# Patient Record
Sex: MALE | Race: OTHER | NOT HISPANIC OR LATINO | URBAN - METROPOLITAN AREA
[De-identification: names, ages, dates, MRNs, and addresses within clinical notes are randomized per-mention and may not be internally consistent; named-entity substitution may affect disease eponyms.]

---

## 2018-05-20 ENCOUNTER — INPATIENT (INPATIENT)
Facility: HOSPITAL | Age: 23
LOS: 1 days | Discharge: HOME | DRG: 312 | End: 2018-05-22
Attending: INTERNAL MEDICINE | Admitting: INTERNAL MEDICINE
Payer: MEDICAID

## 2018-05-20 VITALS
WEIGHT: 179.9 LBS | DIASTOLIC BLOOD PRESSURE: 88 MMHG | OXYGEN SATURATION: 100 % | HEART RATE: 75 BPM | SYSTOLIC BLOOD PRESSURE: 134 MMHG | HEIGHT: 68 IN | RESPIRATION RATE: 16 BRPM

## 2018-05-20 DIAGNOSIS — R41.89 OTHER SYMPTOMS AND SIGNS INVOLVING COGNITIVE FUNCTIONS AND AWARENESS: ICD-10-CM

## 2018-05-20 DIAGNOSIS — R55 SYNCOPE AND COLLAPSE: ICD-10-CM

## 2018-05-20 DIAGNOSIS — Z29.9 ENCOUNTER FOR PROPHYLACTIC MEASURES, UNSPECIFIED: ICD-10-CM

## 2018-05-20 LAB
ACETONE SERPL-MCNC: NEGATIVE — SIGNIFICANT CHANGE UP
ALBUMIN SERPL ELPH-MCNC: 4.1 G/DL — SIGNIFICANT CHANGE UP (ref 3.5–5)
ALP SERPL-CCNC: 56 U/L — SIGNIFICANT CHANGE UP (ref 40–120)
ALT FLD-CCNC: 16 U/L DA — SIGNIFICANT CHANGE UP (ref 10–60)
AMPHET UR-MCNC: NEGATIVE — SIGNIFICANT CHANGE UP
ANION GAP SERPL CALC-SCNC: 8 MMOL/L — SIGNIFICANT CHANGE UP (ref 5–17)
APAP SERPL-MCNC: <2 UG/ML — LOW (ref 10–30)
APPEARANCE UR: CLEAR — SIGNIFICANT CHANGE UP
APTT BLD: 26.7 SEC — LOW (ref 27.5–37.4)
AST SERPL-CCNC: 16 U/L — SIGNIFICANT CHANGE UP (ref 10–40)
BACTERIA # UR AUTO: ABNORMAL /HPF
BARBITURATES UR SCN-MCNC: NEGATIVE — SIGNIFICANT CHANGE UP
BASE EXCESS BLDA CALC-SCNC: 1.4 MMOL/L — SIGNIFICANT CHANGE UP (ref -2–2)
BASOPHILS # BLD AUTO: 0.1 K/UL — SIGNIFICANT CHANGE UP (ref 0–0.2)
BASOPHILS NFR BLD AUTO: 1 % — SIGNIFICANT CHANGE UP (ref 0–2)
BENZODIAZ UR-MCNC: NEGATIVE — SIGNIFICANT CHANGE UP
BILIRUB SERPL-MCNC: 0.4 MG/DL — SIGNIFICANT CHANGE UP (ref 0.2–1.2)
BILIRUB UR-MCNC: NEGATIVE — SIGNIFICANT CHANGE UP
BLOOD GAS COMMENTS ARTERIAL: SIGNIFICANT CHANGE UP
BUN SERPL-MCNC: 10 MG/DL — SIGNIFICANT CHANGE UP (ref 7–18)
CALCIUM SERPL-MCNC: 8.8 MG/DL — SIGNIFICANT CHANGE UP (ref 8.4–10.5)
CHLORIDE SERPL-SCNC: 107 MMOL/L — SIGNIFICANT CHANGE UP (ref 96–108)
CK SERPL-CCNC: 161 U/L — SIGNIFICANT CHANGE UP (ref 35–232)
CO2 SERPL-SCNC: 26 MMOL/L — SIGNIFICANT CHANGE UP (ref 22–31)
COCAINE METAB.OTHER UR-MCNC: NEGATIVE — SIGNIFICANT CHANGE UP
COLOR SPEC: YELLOW — SIGNIFICANT CHANGE UP
COMMENT - URINE: SIGNIFICANT CHANGE UP
CREAT SERPL-MCNC: 1.12 MG/DL — SIGNIFICANT CHANGE UP (ref 0.5–1.3)
DIFF PNL FLD: ABNORMAL
EOSINOPHIL # BLD AUTO: 0.2 K/UL — SIGNIFICANT CHANGE UP (ref 0–0.5)
EOSINOPHIL NFR BLD AUTO: 1.9 % — SIGNIFICANT CHANGE UP (ref 0–6)
EPI CELLS # UR: ABNORMAL /HPF
ETHANOL SERPL-MCNC: <3 MG/DL — SIGNIFICANT CHANGE UP (ref 0–10)
GLUCOSE SERPL-MCNC: 108 MG/DL — HIGH (ref 70–99)
GLUCOSE UR QL: NEGATIVE — SIGNIFICANT CHANGE UP
HCO3 BLDA-SCNC: 25 MMOL/L — SIGNIFICANT CHANGE UP (ref 23–27)
HCT VFR BLD CALC: 48.2 % — SIGNIFICANT CHANGE UP (ref 39–50)
HGB BLD-MCNC: 15.8 G/DL — SIGNIFICANT CHANGE UP (ref 13–17)
HOROWITZ INDEX BLDA+IHG-RTO: 21 — SIGNIFICANT CHANGE UP
INR BLD: 1.06 RATIO — SIGNIFICANT CHANGE UP (ref 0.88–1.16)
KETONES UR-MCNC: NEGATIVE — SIGNIFICANT CHANGE UP
LACTATE SERPL-SCNC: 1.7 MMOL/L — SIGNIFICANT CHANGE UP (ref 0.7–2)
LEUKOCYTE ESTERASE UR-ACNC: NEGATIVE — SIGNIFICANT CHANGE UP
LYMPHOCYTES # BLD AUTO: 3.8 K/UL — HIGH (ref 1–3.3)
LYMPHOCYTES # BLD AUTO: 39.2 % — SIGNIFICANT CHANGE UP (ref 13–44)
MAGNESIUM SERPL-MCNC: 2.2 MG/DL — SIGNIFICANT CHANGE UP (ref 1.6–2.6)
MCHC RBC-ENTMCNC: 29.3 PG — SIGNIFICANT CHANGE UP (ref 27–34)
MCHC RBC-ENTMCNC: 32.7 GM/DL — SIGNIFICANT CHANGE UP (ref 32–36)
MCV RBC AUTO: 89.6 FL — SIGNIFICANT CHANGE UP (ref 80–100)
METHADONE UR-MCNC: NEGATIVE — SIGNIFICANT CHANGE UP
MONOCYTES # BLD AUTO: 1.3 K/UL — HIGH (ref 0–0.9)
MONOCYTES NFR BLD AUTO: 13.6 % — SIGNIFICANT CHANGE UP (ref 2–14)
NEUTROPHILS # BLD AUTO: 4.2 K/UL — SIGNIFICANT CHANGE UP (ref 1.8–7.4)
NEUTROPHILS NFR BLD AUTO: 44.3 % — SIGNIFICANT CHANGE UP (ref 43–77)
NITRITE UR-MCNC: NEGATIVE — SIGNIFICANT CHANGE UP
OPIATES UR-MCNC: NEGATIVE — SIGNIFICANT CHANGE UP
PCO2 BLDA: 39 MMHG — SIGNIFICANT CHANGE UP (ref 32–46)
PCP SPEC-MCNC: SIGNIFICANT CHANGE UP
PCP UR-MCNC: NEGATIVE — SIGNIFICANT CHANGE UP
PH BLDA: 7.43 — SIGNIFICANT CHANGE UP (ref 7.35–7.45)
PH UR: 6 — SIGNIFICANT CHANGE UP (ref 5–8)
PLATELET # BLD AUTO: 213 K/UL — SIGNIFICANT CHANGE UP (ref 150–400)
PO2 BLDA: 101 MMHG — SIGNIFICANT CHANGE UP (ref 74–108)
POTASSIUM SERPL-MCNC: 3.4 MMOL/L — LOW (ref 3.5–5.3)
POTASSIUM SERPL-SCNC: 3.4 MMOL/L — LOW (ref 3.5–5.3)
PROT SERPL-MCNC: 7.5 G/DL — SIGNIFICANT CHANGE UP (ref 6–8.3)
PROT UR-MCNC: NEGATIVE — SIGNIFICANT CHANGE UP
PROTHROM AB SERPL-ACNC: 11.6 SEC — SIGNIFICANT CHANGE UP (ref 9.8–12.7)
RBC # BLD: 5.38 M/UL — SIGNIFICANT CHANGE UP (ref 4.2–5.8)
RBC # FLD: 11.2 % — SIGNIFICANT CHANGE UP (ref 10.3–14.5)
RBC CASTS # UR COMP ASSIST: SIGNIFICANT CHANGE UP /HPF (ref 0–2)
SALICYLATES SERPL-MCNC: <1.7 MG/DL — LOW (ref 2.8–20)
SAO2 % BLDA: 98 % — HIGH (ref 92–96)
SODIUM SERPL-SCNC: 141 MMOL/L — SIGNIFICANT CHANGE UP (ref 135–145)
SP GR SPEC: 1.01 — SIGNIFICANT CHANGE UP (ref 1.01–1.02)
THC UR QL: NEGATIVE — SIGNIFICANT CHANGE UP
UROBILINOGEN FLD QL: NEGATIVE — SIGNIFICANT CHANGE UP
WBC # BLD: 9.6 K/UL — SIGNIFICANT CHANGE UP (ref 3.8–10.5)
WBC # FLD AUTO: 9.6 K/UL — SIGNIFICANT CHANGE UP (ref 3.8–10.5)
WBC UR QL: SIGNIFICANT CHANGE UP /HPF (ref 0–5)

## 2018-05-20 PROCEDURE — 71045 X-RAY EXAM CHEST 1 VIEW: CPT | Mod: 26

## 2018-05-20 PROCEDURE — 70450 CT HEAD/BRAIN W/O DYE: CPT | Mod: 26,59

## 2018-05-20 PROCEDURE — 99223 1ST HOSP IP/OBS HIGH 75: CPT

## 2018-05-20 PROCEDURE — 70498 CT ANGIOGRAPHY NECK: CPT | Mod: 26

## 2018-05-20 PROCEDURE — 72125 CT NECK SPINE W/O DYE: CPT | Mod: 26

## 2018-05-20 PROCEDURE — 99291 CRITICAL CARE FIRST HOUR: CPT

## 2018-05-20 PROCEDURE — 99292 CRITICAL CARE ADDL 30 MIN: CPT

## 2018-05-20 PROCEDURE — 70496 CT ANGIOGRAPHY HEAD: CPT | Mod: 26

## 2018-05-20 RX ORDER — SODIUM CHLORIDE 9 MG/ML
1000 INJECTION INTRAMUSCULAR; INTRAVENOUS; SUBCUTANEOUS
Qty: 0 | Refills: 0 | Status: DISCONTINUED | OUTPATIENT
Start: 2018-05-20 | End: 2018-05-21

## 2018-05-20 RX ADMIN — SODIUM CHLORIDE 200 MILLILITER(S): 9 INJECTION INTRAMUSCULAR; INTRAVENOUS; SUBCUTANEOUS at 17:25

## 2018-05-20 NOTE — ED PROVIDER NOTE - PROGRESS NOTE DETAILS
pt cont. with eyes open, unresponsive, will consult Neuro, admission spoke with Neuro, suggests to get CT head/neck angio, then LP shortly after speaking with neurologist, pt sat up, OX3, claims he can hear our conversation, but just can't move Seen by Dr. Meek neuro, advised to admit pt for poss seizure

## 2018-05-20 NOTE — ED PROVIDER NOTE - CARE PLAN
Principal Discharge DX:	Unresponsiveness  Secondary Diagnosis:	Seizure  Secondary Diagnosis:	Psychogenic coma

## 2018-05-20 NOTE — H&P ADULT - ASSESSMENT
22 years old male with no PMH or PSH presented to ED with syncope and unresponsiveness. 22 years old male with no PMH or PSH presented to ED with syncope and unresponsiveness. Admitted to medicine for further management.

## 2018-05-20 NOTE — ED ADULT NURSE NOTE - ED STAT RN HANDOFF DETAILS 2
patient awake and alert, breathing not distressed. IV Acces remans in-situ. Patient is being nursed on cardiac monitor. Sinus rhythm noted. No seizure activity noted. Patient I being admitted, awaiting bed availability.

## 2018-05-20 NOTE — ED PROVIDER NOTE - PHYSICAL EXAMINATION
NEUROLOGICAL: Awake w/ eyes open but unresponsive, Babinski down, no clonus. NEUROLOGICAL: Awake w/ eyes open but unresponsive, Babinski down, no clonus.  Head- Rt frontal sl hematoma noted

## 2018-05-20 NOTE — ED PROVIDER NOTE - OBJECTIVE STATEMENT
21 y/o M coming from home was BIB EMS c/o AMS x today. EMS reports that pt not talking, no response to rub, pupils continuously dilated. Pt not on meds, no PMHx at all-- reports that pt took Theraflu for mild cough x 2 days and fell asleep, woke up, went to the bathroom, fell and hit his head and became unresponsive. Sudden onset. EMS denies shaking, sister denies shaking-- reports that pt became "frozen." Non-smoker, non-drinker, no drugs as per sister. NKDA.

## 2018-05-20 NOTE — H&P ADULT - HISTORY OF PRESENT ILLNESS
22 years old male with no PMH or PSH presented to ED with syncope and unresponsiveness. Family reported patient synopsized after coming back from bathroom and became unresponsive so EMS was called. Family denies any shaking, eye rolling or urinary or fecal incontinence.  Per EMS, patient was unresponsive, not talking, no response to rub, pupils continuously dilated.     In ED patient was still unresponsive to painful stimuli so Neurology was consulted. Initial vitals wnl. EKG sinus arrythmia. CT head , CT cervical spine and CTA Head unremarkable. Patient woke up spontaneously in a while, was alert, awake and oriented x 3, initially said can hear only but cant move ; later was able to move his extremities as well. Was then assessed by Neurologist in ED and admitted for further monitoring.     Patient seen and examined at bedside, currently is AAO x 3, denies any complains. 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 Reported slight dizziness before the event but denied any other complains.

## 2018-05-20 NOTE — H&P ADULT - PROBLEM SELECTOR PLAN 2
IMPROVE VTE Individual Risk Assessment          RISK                                                          Points  [  ] Previous VTE                                                3  [  ] Thrombophilia                                             2  [  ] Lower limb paralysis                                   2        (unable to hold up >15 seconds)    [  ] Current Cancer                                             2         (within 6 months)  [  ] Immobilization > 24 hrs                              1  [  ] ICU/CCU stay > 24 hours                             1  [  ] Age > 60                                                         1    IMPROVE VTE Score: 0  No indication for DVT ppx.

## 2018-05-20 NOTE — CONSULT NOTE ADULT - SUBJECTIVE AND OBJECTIVE BOX
Patient is a 22y old  Male who presents with a chief complaint of     HPI:  Patient brought in by ambulance, family states he fell in the bathroom and was then unresponsive.  They state he has been sleeping a little more than usual the last two days, denies HA or fever.  Felt he had a "cold" per family.  I was informed patient was unresponsive to painful stimuli, arrived to perform LP and r/o meningitis due to the history.  On my arrival to the ED, I am informed that patient is awake and fully aware and following commands    PAST MEDICAL & SURGICAL HISTORY:  No pertinent past medical history  No significant past surgical history      FAMILY HISTORY: NC        Social Hx:  Nonsmoker, no drug or alcohol use    MEDICATIONS  (STANDING):  sodium chloride 0.9%. 1000 milliLiter(s) (200 mL/Hr) IV Continuous <Continuous>       Allergies    No Known Allergies    Intolerances        ROS: Pertinent positives in HPI, all other ROS were reviewed and are negative.      Vital Signs Last 24 Hrs  T(C): --  T(F): --  HR: 75 (20 May 2018 15:49) (75 - 75)  BP: 134/88 (20 May 2018 15:49) (134/88 - 134/88)  BP(mean): --  RR: 16 (20 May 2018 15:49) (16 - 16)  SpO2: 100% (20 May 2018 15:49) (100% - 100%)        Constitutional: awake and alert. He does not remember the episode of unresponsiveness but states he remembers people speaking and not being able to answer  HEENT: PERRLA, EOMI,   Neck: Supple.  Respiratory: Breath sounds are clear bilaterally  Cardiovascular: S1 and S2, regular / irregular rhythm  Gastrointestinal: soft, nontender  Extremities:  no edema  Vascular: Carotid Bruit - no  Musculoskeletal: no joint swelling/tenderness, no abnormal movements  Skin: No rashes    Neurological exam:  HF: A x O x 3. Appropriately interactive, normal affect. Speech fluent, No Aphasia or paraphasic errors. Naming /repetition intact   CN: HALIE, EOMI, VFF, facial sensation normal, no NLFD, tongue midline, Palate moves equally, SCM equal bilaterally  Motor: No pronator drift, Strength 5/5 in all 4 ext, normal bulk and tone, no tremor, rigidity or bradykinesia.    Sens: Intact to light touch / PP/ VS/ JS    Reflexes: Symmetric and normal . BJ 1+, BR 1+, KJ 1+, AJ 1+, downgoing toes b/l  Coord:  No FNFA, dysmetria, EILEEN intact   Gait/Balance: not tested      Labs:                        15.8   9.6   )-----------( 213      ( 20 May 2018 16:18 )             48.2     05-20    141  |  107  |  10  ----------------------------<  108<H>  3.4<L>   |  26  |  1.12    Ca    8.8      20 May 2018 16:18  Mg     2.2     05-20    TPro  7.5  /  Alb  4.1  /  TBili  0.4  /  DBili  x   /  AST  16  /  ALT  16  /  AlkPhos  56  05-20        PT/INR - ( 20 May 2018 16:18 )   PT: 11.6 sec;   INR: 1.06 ratio         PTT - ( 20 May 2018 16:18 )  PTT:26.7 sec    Radiology report:  CT head normal  CTA head and neck normal by my review, patent basilar    A/P:  Unclear etiology of episode of unresponsiveness.  Could have been micturition syncope with prolonged unresponsive state.  Currently he has no neuro deficit and is fully awake and responsive.  Doubt he had seizure to explain the episode as his toes are down and he would have had a longer post-ictal state.  It is possible he had a seizure and then was in a post-ictal state.    Will monitor through the night and if has seizure will treat.  No meds recommended at this time.

## 2018-05-20 NOTE — ED ADULT NURSE NOTE - OBJECTIVE STATEMENT
Pt came as a notification for AMS, s/p syncope at home  Pt is stuporous, non verbal, not responding to pain, not moving at all

## 2018-05-20 NOTE — ED PROVIDER NOTE - MEDICAL DECISION MAKING DETAILS
Pt w/ sudden onset of unresponsiveness, flaccid, does not follow commands. Etiology unknown-- Will get labs, tox screen, CT head, and neuro consult.

## 2018-05-20 NOTE — H&P ADULT - PROBLEM SELECTOR PLAN 1
-22 m with no pmh or psh presented with syncope and unresponsiveness  -Spontaneous resolution of consciousness after couple of hours.  -CT head , CT cervical spine and CTA head unremarkable  -EKG; sinus arrythmia  -Utox negative  -Evaluated by Neurologist in ED , recommended only monitoring for now since back to consciousness.  -Possible differentials include Vasovagal syncope v/s Micturition syncope v/s Seizure.   -Monitor on tele

## 2018-05-21 LAB
24R-OH-CALCIDIOL SERPL-MCNC: 8.9 NG/ML — LOW (ref 30–80)
ANION GAP SERPL CALC-SCNC: 5 MMOL/L — SIGNIFICANT CHANGE UP (ref 5–17)
BASOPHILS # BLD AUTO: 0.1 K/UL — SIGNIFICANT CHANGE UP (ref 0–0.2)
BASOPHILS NFR BLD AUTO: 0.8 % — SIGNIFICANT CHANGE UP (ref 0–2)
BUN SERPL-MCNC: 7 MG/DL — SIGNIFICANT CHANGE UP (ref 7–18)
CALCIUM SERPL-MCNC: 9.1 MG/DL — SIGNIFICANT CHANGE UP (ref 8.4–10.5)
CHLORIDE SERPL-SCNC: 109 MMOL/L — HIGH (ref 96–108)
CHOLEST SERPL-MCNC: 164 MG/DL — SIGNIFICANT CHANGE UP (ref 10–199)
CK MB BLD-MCNC: <0.8 % — SIGNIFICANT CHANGE UP (ref 0–3.5)
CK MB CFR SERPL CALC: <1 NG/ML — SIGNIFICANT CHANGE UP (ref 0–3.6)
CK SERPL-CCNC: 131 U/L — SIGNIFICANT CHANGE UP (ref 35–232)
CO2 SERPL-SCNC: 27 MMOL/L — SIGNIFICANT CHANGE UP (ref 22–31)
CREAT SERPL-MCNC: 1 MG/DL — SIGNIFICANT CHANGE UP (ref 0.5–1.3)
CULTURE RESULTS: NO GROWTH — SIGNIFICANT CHANGE UP
EOSINOPHIL # BLD AUTO: 0 K/UL — SIGNIFICANT CHANGE UP (ref 0–0.5)
EOSINOPHIL NFR BLD AUTO: 0.6 % — SIGNIFICANT CHANGE UP (ref 0–6)
FOLATE SERPL-MCNC: 10.6 NG/ML — SIGNIFICANT CHANGE UP
GLUCOSE SERPL-MCNC: 90 MG/DL — SIGNIFICANT CHANGE UP (ref 70–99)
HBA1C BLD-MCNC: 5.3 % — SIGNIFICANT CHANGE UP (ref 4–5.6)
HCT VFR BLD CALC: 44.5 % — SIGNIFICANT CHANGE UP (ref 39–50)
HDLC SERPL-MCNC: 45 MG/DL — SIGNIFICANT CHANGE UP (ref 40–125)
HGB BLD-MCNC: 14.7 G/DL — SIGNIFICANT CHANGE UP (ref 13–17)
LIPID PNL WITH DIRECT LDL SERPL: 107 MG/DL — SIGNIFICANT CHANGE UP
LYMPHOCYTES # BLD AUTO: 2.1 K/UL — SIGNIFICANT CHANGE UP (ref 1–3.3)
LYMPHOCYTES # BLD AUTO: 27.3 % — SIGNIFICANT CHANGE UP (ref 13–44)
MAGNESIUM SERPL-MCNC: 2.1 MG/DL — SIGNIFICANT CHANGE UP (ref 1.6–2.6)
MCHC RBC-ENTMCNC: 29.7 PG — SIGNIFICANT CHANGE UP (ref 27–34)
MCHC RBC-ENTMCNC: 33.1 GM/DL — SIGNIFICANT CHANGE UP (ref 32–36)
MCV RBC AUTO: 89.7 FL — SIGNIFICANT CHANGE UP (ref 80–100)
MONOCYTES # BLD AUTO: 0.9 K/UL — SIGNIFICANT CHANGE UP (ref 0–0.9)
MONOCYTES NFR BLD AUTO: 11.3 % — SIGNIFICANT CHANGE UP (ref 2–14)
NEUTROPHILS # BLD AUTO: 4.6 K/UL — SIGNIFICANT CHANGE UP (ref 1.8–7.4)
NEUTROPHILS NFR BLD AUTO: 60 % — SIGNIFICANT CHANGE UP (ref 43–77)
PHOSPHATE SERPL-MCNC: 3.1 MG/DL — SIGNIFICANT CHANGE UP (ref 2.5–4.5)
PLATELET # BLD AUTO: 196 K/UL — SIGNIFICANT CHANGE UP (ref 150–400)
POTASSIUM SERPL-MCNC: 3.7 MMOL/L — SIGNIFICANT CHANGE UP (ref 3.5–5.3)
POTASSIUM SERPL-SCNC: 3.7 MMOL/L — SIGNIFICANT CHANGE UP (ref 3.5–5.3)
RBC # BLD: 4.96 M/UL — SIGNIFICANT CHANGE UP (ref 4.2–5.8)
RBC # FLD: 11.1 % — SIGNIFICANT CHANGE UP (ref 10.3–14.5)
SODIUM SERPL-SCNC: 141 MMOL/L — SIGNIFICANT CHANGE UP (ref 135–145)
SPECIMEN SOURCE: SIGNIFICANT CHANGE UP
TOTAL CHOLESTEROL/HDL RATIO MEASUREMENT: 3.6 RATIO — SIGNIFICANT CHANGE UP (ref 3.4–9.6)
TRIGL SERPL-MCNC: 59 MG/DL — SIGNIFICANT CHANGE UP (ref 10–149)
TROPONIN I SERPL-MCNC: <0.015 NG/ML — SIGNIFICANT CHANGE UP (ref 0–0.04)
TSH SERPL-MCNC: 0.97 UU/ML — SIGNIFICANT CHANGE UP (ref 0.34–4.82)
VIT B12 SERPL-MCNC: 372 PG/ML — SIGNIFICANT CHANGE UP (ref 232–1245)
WBC # BLD: 7.6 K/UL — SIGNIFICANT CHANGE UP (ref 3.8–10.5)
WBC # FLD AUTO: 7.6 K/UL — SIGNIFICANT CHANGE UP (ref 3.8–10.5)

## 2018-05-21 PROCEDURE — 95819 EEG AWAKE AND ASLEEP: CPT | Mod: 26

## 2018-05-21 RX ORDER — SODIUM CHLORIDE 9 MG/ML
1000 INJECTION INTRAMUSCULAR; INTRAVENOUS; SUBCUTANEOUS
Qty: 0 | Refills: 0 | Status: DISCONTINUED | OUTPATIENT
Start: 2018-05-21 | End: 2018-05-22

## 2018-05-21 RX ADMIN — SODIUM CHLORIDE 100 MILLILITER(S): 9 INJECTION INTRAMUSCULAR; INTRAVENOUS; SUBCUTANEOUS at 19:38

## 2018-05-21 NOTE — PROGRESS NOTE ADULT - SUBJECTIVE AND OBJECTIVE BOX
22 years old male with no PMH or PSH presented to ED with syncope and unresponsiveness. Family reported patient synopsized after coming back from bathroom and became unresponsive so EMS was called. Family denies any shaking, eye rolling or urinary or fecal incontinence.  Per EMS, patient was unresponsive, not talking, no response to rub, pupils continuously dilated.     In ED patient was still unresponsive to painful stimuli so Neurology was consulted. Initial vitals wnl. EKG sinus arrythmia. CT head , CT cervical spine and CTA Head unremarkable. Patient woke up spontaneously in a while, was alert, awake and oriented x 3, initially said can hear only but cant move ; later was able to move his extremities as well. Was then assessed by Neurologist in ED and admitted for further monitoring.       Review of Systems:  · Negative General Symptoms	no fever; no chills	  · Negative Skin Symptoms	no rash; no itching	  · Ophthalmologic	negative	  · ENMT	negative	  · Negative Respiratory and Thorax Symptoms	no dyspnea; no cough; no pleuritic chest pain	  · Negative Cardiovascular Symptoms	no chest pain; no palpitations; no peripheral edema	  · Negative Gastrointestinal Symptoms	no nausea; no vomiting; no change in bowel habits; no abdominal pain	  · Negative General Genitourinary Symptoms	no hematuria; no renal colic; no flank pain L; no flank pain R; no dysuria; normal urinary frequency	  · Negative Musculoskeletal Symptoms	no arthralgia; no arthritis; no muscle cramps; no muscle weakness	  · Negative Neurological Symptoms	no weakness; no difficulty walking; no confusion	  · Neurological Symptoms	syncope; loss of consciousness	  · Psychiatric	negative	  · Hematology/Lymphatics	negative	  · Endocrine	negative	  · Allergic/Immunologic	negative	      pt seen in ed [ x ], reg med floor [   ], bed [ x ], chair at bedside [   ], a+o x3 [ x ], lethargic [  ],  nad [x ]      Allergies    No Known Allergies        Vitals    T(F): 98.9 (05-21-18 @ 15:58), Max: 99.9 (05-20-18 @ 23:29)  HR: 72 (05-21-18 @ 15:58) (72 - 87)  BP: 126/76 (05-21-18 @ 15:58) (124/72 - 127/76)  RR: 16 (05-21-18 @ 15:58) (16 - 18)  SpO2: 100% (05-21-18 @ 15:58) (99% - 100%)  Wt(kg): --  CAPILLARY BLOOD GLUCOSE          Labs                          14.7   7.6   )-----------( 196      ( 21 May 2018 05:49 )             44.5       05-21    141  |  109<H>  |  7   ----------------------------<  90  3.7   |  27  |  1.00    Ca    9.1      21 May 2018 05:49  Phos  3.1     05-21  Mg     2.1     05-21    TPro  7.5  /  Alb  4.1  /  TBili  0.4  /  DBili  x   /  AST  16  /  ALT  16  /  AlkPhos  56  05-20      CARDIAC MARKERS ( 21 May 2018 05:49 )  <0.015 ng/mL / x     / 131 U/L / x     / <1.0 ng/mL  CARDIAC MARKERS ( 20 May 2018 16:18 )  x     / x     / 161 U/L / x     / x            EEG Summary / Classification:  Normal  EEG in the awake and drowsy states.    EEG Impression / Clinical Correlate:  There were no epileptiform abnormalities recorded.          Radiology Results    < from: CT Angio Head w/ IV Cont (05.20.18 @ 19:05) >  INTERPRETATION:  CT angiogram head and neck with contrast  History unresponsive, syncope  ContrastOmnipaque 90 cc; 10 cc discarded  Multiplanar MIPS included    Examination of the intracranial circulation is negative for stenosis,   occlusion or aneurysm. There is normal venous sinus enhancement    Examination of the cervical circulation is negative for carotid or   vertebral artery stenosis, occlusion or dissection. The left vertebral   artery is slightly dominant.    IMPRESSION:  Normal study      < end of copied text >        Meds    MEDICATIONS  (STANDING):  sodium chloride 0.9%. 1000 milliLiter(s) (100 mL/Hr) IV Continuous <Continuous>      MEDICATIONS  (PRN):      Physical Exam    Neuro :  no focal deficits  Respiratory: CTA B/L  CV: RRR, S1S2, no murmurs,   Abdominal: Soft, NT, ND +BS,  Extremities: No edema, + peripheral pulses    ASSESSMENT    unresponsiveness and unresponsiveness  r/o post micturation syncope  r/o seiz  No pertinent past medical history  No significant past surgical history      PLAN    nuero f/u  cta head and neck neeg noted above  eeg neg noted above  will cont to monitor for any new episodes  d/c plan once cleared by neuro

## 2018-05-21 NOTE — EEG REPORT - NS EEG TEXT BOX
[facility]  Report of Routine EEG     [facility]: [address] [place] [address], Phone: [phone]  [facility]: [address] [place] [address], Phone: [phone]  Office: [address] [facility], [facility] [address], [place] [address], Phone: [phone]    Patient Name: JEANETTE CAMPOS    Age: 22 y  : 1995  Patient ID: -, MRN #: -, Location: -  Referring Physician: DR CARVAJAL    EEG #:   Study Date: 2018		    Technical Information:					  On Instrument: -  Placement and Labeling of Electrodes:  The EEG was performed utilizing 20 channels referential EEG connections (coronal over temporal over parasagittal montage) using all standard 10-20 electrode placements with EKG.  Recording was at a sampling rate of 256 samples per second per channel.  A low light infrared camera was used for low light recording.  Mina and seizure detection algorithms were utilized.    History:  -Syncope    Medication	  No AEDs	    Study Interpretation:    FINDINGS:  The background was continuous, spontaneously variable and reactive. During wakefulness, the posteriorly dominant rhythm consisted of symmetric, well-modulated 9 Hz activity, with amplitude to 30 uV, that attenuated to eye opening. Low amplitude frontal beta was noted in wakefulness.    Background Slowing:  No generalized background slowing was present.    Focal Slowing:   None was present.    Sleep Background:  Drowsiness was characterized by fragmentation, attenuation, and slowing of the background activity.    Stage II sleep transients were not recorded.    Other Non-Epileptiform Findings:  None were present.    Interictal Epileptiform Activity:   None were present.    Events:  Clinical events: None recorded.  Seizures: None recorded.    Activation Procedures:   Hyperventilation was performed and did not elicit any abnormalities.    Photic stimulation was performed and did not elicit any abnormalities.      Artifacts:  Intermittent myogenic and movement artifacts were noted.    ECG:  The heart rate on single channel ECG was predominantly between 70-80 BPM.    EEG Summary / Classification:  Normal  EEG in the awake and drowsy states.    EEG Impression / Clinical Correlate:  There were no epileptiform abnormalities recorded.    This EEG is within normal limits.    Yessica Sherwood MD  ________________________________________  Neurophysiology/Epilepsy Fellow, [facility] [place] [facility]  Report of Routine EEG     [facility]: [address] [place] [address], Phone: [phone]  [facility]: [address] [place] [address], Phone: [phone]  Office: 1 [facility], [facility] [address], [place] [address], Phone: [phone]    Patient Name: JEANETTE CAMPOS    Age: 22 y  : 1995  Patient ID: -, MRN #: -, Location: -  Referring Physician: DR CARVAJAL    EEG #:   Study Date: 2018		    Technical Information:					  On Instrument: -  Placement and Labeling of Electrodes:  The EEG was performed utilizing 20 channels referential EEG connections (coronal over temporal over parasagittal montage) using all standard 10-20 electrode placements with EKG.  Recording was at a sampling rate of 256 samples per second per channel.  A low light infrared camera was used for low light recording.  Mina and seizure detection algorithms were utilized.    History:  -Syncope    Medication	  No AEDs	    Study Interpretation:    FINDINGS:  The background was continuous, spontaneously variable and reactive. During wakefulness, the posteriorly dominant rhythm consisted of symmetric, well-modulated 9 Hz activity, with amplitude to 30 uV, that attenuated to eye opening. Low amplitude frontal beta was noted in wakefulness.    Background Slowing:  No generalized background slowing was present.    Focal Slowing:   None was present.    Sleep Background:  Drowsiness was characterized by fragmentation, attenuation, and slowing of the background activity.    Stage II sleep transients were not recorded.    Other Non-Epileptiform Findings:  None were present.    Interictal Epileptiform Activity:   None were present.    Events:  Clinical events: None recorded.  Seizures: None recorded.    Activation Procedures:   Hyperventilation was performed and did not elicit any abnormalities.    Photic stimulation was performed and did not elicit any abnormalities.      Artifacts:  Intermittent myogenic and movement artifacts were noted.    ECG:  The heart rate on single channel ECG was predominantly between 70-80 BPM.    EEG Summary / Classification:  Normal  EEG in the awake and drowsy states.    EEG Impression / Clinical Correlate:  There were no epileptiform abnormalities recorded.      Yessica Sherwood MD  ________________________________________  Neurophysiology/Epilepsy Fellow, [facility] [place] [facility]  Report of Routine EEG     [facility]: [address] [place] [address], Phone: [phone]  [facility]: [address] [place] [address], Phone: [phone]  Office: 1 [facility], [facility] [address], [place] [address], Phone: [phone]    Patient Name: JEANETTE CAMPOS    Age: 22 y  : 1995  Patient ID: -, MRN #: -, Location: -  Referring Physician: DR CARVAJAL    EEG #:   Study Date: 2018	 	    Technical Information:					  On Instrument: -  Placement and Labeling of Electrodes:  The EEG was performed utilizing 20 channels referential EEG connections (coronal over temporal over parasagittal montage) using all standard 10-20 electrode placements with EKG.  Recording was at a sampling rate of 256 samples per second per channel.  A low light infrared camera was used for low light recording.  Mina and seizure detection algorithms were utilized.    History:  -Syncope    Medication	  No AEDs	    Study Interpretation:    FINDINGS:  The background was continuous, spontaneously variable and reactive. During wakefulness, the posteriorly dominant rhythm consisted of symmetric, well-modulated 9 Hz activity, with amplitude to 30 uV, that attenuated to eye opening. Low amplitude frontal beta was noted in wakefulness.    Background Slowing:  No generalized background slowing was present.    Focal Slowing:   None was present.    Sleep Background:  Drowsiness was characterized by fragmentation, attenuation, and slowing of the background activity.    Stage II sleep transients were not recorded.    Other Non-Epileptiform Findings:  None were present.    Interictal Epileptiform Activity:   None were present.    Events:  Clinical events: None recorded.  Seizures: None recorded.    Activation Procedures:   Hyperventilation was performed and did not elicit any abnormalities.    Photic stimulation was performed and did not elicit any abnormalities.      Artifacts:  Intermittent myogenic and movement artifacts were noted.    ECG:  The heart rate on single channel ECG was predominantly between 70-80 BPM.    EEG Summary / Classification:  Normal  EEG in the awake and drowsy states.    EEG Impression / Clinical Correlate:  There were no epileptiform abnormalities recorded.      Yessica Sherwood MD  ________________________________________  Neurophysiology/Epilepsy Fellow, [facility] [place]

## 2018-05-22 VITALS
SYSTOLIC BLOOD PRESSURE: 127 MMHG | DIASTOLIC BLOOD PRESSURE: 80 MMHG | TEMPERATURE: 98 F | RESPIRATION RATE: 16 BRPM | OXYGEN SATURATION: 98 % | HEART RATE: 65 BPM

## 2018-05-22 LAB
ANION GAP SERPL CALC-SCNC: 5 MMOL/L — SIGNIFICANT CHANGE UP (ref 5–17)
BUN SERPL-MCNC: 8 MG/DL — SIGNIFICANT CHANGE UP (ref 7–18)
CALCIUM SERPL-MCNC: 9.5 MG/DL — SIGNIFICANT CHANGE UP (ref 8.4–10.5)
CHLORIDE SERPL-SCNC: 106 MMOL/L — SIGNIFICANT CHANGE UP (ref 96–108)
CO2 SERPL-SCNC: 29 MMOL/L — SIGNIFICANT CHANGE UP (ref 22–31)
CREAT SERPL-MCNC: 0.95 MG/DL — SIGNIFICANT CHANGE UP (ref 0.5–1.3)
GLUCOSE SERPL-MCNC: 107 MG/DL — HIGH (ref 70–99)
POTASSIUM SERPL-MCNC: 4.2 MMOL/L — SIGNIFICANT CHANGE UP (ref 3.5–5.3)
POTASSIUM SERPL-SCNC: 4.2 MMOL/L — SIGNIFICANT CHANGE UP (ref 3.5–5.3)
SODIUM SERPL-SCNC: 140 MMOL/L — SIGNIFICANT CHANGE UP (ref 135–145)

## 2018-05-22 PROCEDURE — 70450 CT HEAD/BRAIN W/O DYE: CPT

## 2018-05-22 PROCEDURE — 81001 URINALYSIS AUTO W/SCOPE: CPT

## 2018-05-22 PROCEDURE — 82803 BLOOD GASES ANY COMBINATION: CPT

## 2018-05-22 PROCEDURE — 95819 EEG AWAKE AND ASLEEP: CPT

## 2018-05-22 PROCEDURE — 82553 CREATINE MB FRACTION: CPT

## 2018-05-22 PROCEDURE — 85610 PROTHROMBIN TIME: CPT

## 2018-05-22 PROCEDURE — 80061 LIPID PANEL: CPT

## 2018-05-22 PROCEDURE — 85730 THROMBOPLASTIN TIME PARTIAL: CPT

## 2018-05-22 PROCEDURE — 82550 ASSAY OF CK (CPK): CPT

## 2018-05-22 PROCEDURE — 87086 URINE CULTURE/COLONY COUNT: CPT

## 2018-05-22 PROCEDURE — 82009 KETONE BODYS QUAL: CPT

## 2018-05-22 PROCEDURE — 80307 DRUG TEST PRSMV CHEM ANLYZR: CPT

## 2018-05-22 PROCEDURE — 99233 SBSQ HOSP IP/OBS HIGH 50: CPT

## 2018-05-22 PROCEDURE — 82306 VITAMIN D 25 HYDROXY: CPT

## 2018-05-22 PROCEDURE — 72125 CT NECK SPINE W/O DYE: CPT

## 2018-05-22 PROCEDURE — 93005 ELECTROCARDIOGRAM TRACING: CPT

## 2018-05-22 PROCEDURE — 99291 CRITICAL CARE FIRST HOUR: CPT | Mod: 25

## 2018-05-22 PROCEDURE — 82746 ASSAY OF FOLIC ACID SERUM: CPT

## 2018-05-22 PROCEDURE — 71045 X-RAY EXAM CHEST 1 VIEW: CPT

## 2018-05-22 PROCEDURE — 84443 ASSAY THYROID STIM HORMONE: CPT

## 2018-05-22 PROCEDURE — 80053 COMPREHEN METABOLIC PANEL: CPT

## 2018-05-22 PROCEDURE — 84484 ASSAY OF TROPONIN QUANT: CPT

## 2018-05-22 PROCEDURE — 87040 BLOOD CULTURE FOR BACTERIA: CPT

## 2018-05-22 PROCEDURE — 85027 COMPLETE CBC AUTOMATED: CPT

## 2018-05-22 PROCEDURE — 84100 ASSAY OF PHOSPHORUS: CPT

## 2018-05-22 PROCEDURE — 82607 VITAMIN B-12: CPT

## 2018-05-22 PROCEDURE — 70498 CT ANGIOGRAPHY NECK: CPT

## 2018-05-22 PROCEDURE — 99292 CRITICAL CARE ADDL 30 MIN: CPT | Mod: 25

## 2018-05-22 PROCEDURE — 70496 CT ANGIOGRAPHY HEAD: CPT

## 2018-05-22 PROCEDURE — 83735 ASSAY OF MAGNESIUM: CPT

## 2018-05-22 PROCEDURE — 95957 EEG DIGITAL ANALYSIS: CPT

## 2018-05-22 PROCEDURE — 83605 ASSAY OF LACTIC ACID: CPT

## 2018-05-22 PROCEDURE — 83036 HEMOGLOBIN GLYCOSYLATED A1C: CPT

## 2018-05-22 PROCEDURE — 93306 TTE W/DOPPLER COMPLETE: CPT

## 2018-05-22 PROCEDURE — 80048 BASIC METABOLIC PNL TOTAL CA: CPT

## 2018-05-22 RX ORDER — ERGOCALCIFEROL 1.25 MG/1
50000 CAPSULE ORAL
Qty: 0 | Refills: 0 | Status: DISCONTINUED | OUTPATIENT
Start: 2018-05-22 | End: 2018-05-22

## 2018-05-22 NOTE — CONSULT NOTE ADULT - ASSESSMENT
22 yr old with syncope.  1.Echocardiogram.  2.Neurology work-up negative.  3.Tilt as outpatient.  4.Check orthostatic bp.

## 2018-05-22 NOTE — PROGRESS NOTE ADULT - SUBJECTIVE AND OBJECTIVE BOX
Patient is a 22y old  Male who presents with a chief complaint of syncope -- r/o seizure (22 May 2018 10:59)    pt seen in icu [  ], reg med floor [   ], bed [  ], chair at bedside [   ], a+o x3 [  ], lethargic [  ],  nad [  ]    martinez [  ], ngt [  ], peg [  ], et tube [  ], cent line [  ], picc line [  ]        Allergies    No Known Allergies        Vitals    T(F): 97.7 (05-22-18 @ 11:13), Max: 98.9 (05-21-18 @ 15:58)  HR: 65 (05-22-18 @ 11:13) (63 - 79)  BP: 127/80 (05-22-18 @ 11:13) (113/60 - 127/80)  RR: 16 (05-22-18 @ 11:13) (16 - 17)  SpO2: 98% (05-22-18 @ 11:13) (98% - 100%)  Wt(kg): --  CAPILLARY BLOOD GLUCOSE          Labs                          14.7   7.6   )-----------( 196      ( 21 May 2018 05:49 )             44.5       05-22    140  |  106  |  8   ----------------------------<  107<H>  4.2   |  29  |  0.95    Ca    9.5      22 May 2018 06:33  Phos  3.1     05-21  Mg     2.1     05-21    TPro  7.5  /  Alb  4.1  /  TBili  0.4  /  DBili  x   /  AST  16  /  ALT  16  /  AlkPhos  56  05-20      CARDIAC MARKERS ( 21 May 2018 05:49 )  <0.015 ng/mL / x     / 131 U/L / x     / <1.0 ng/mL  CARDIAC MARKERS ( 20 May 2018 16:18 )  x     / x     / 161 U/L / x     / x            .Blood Blood  05-20 @ 21:47   No growth to date.  --  --      .Urine Clean Catch (Midstream)  05-20 @ 21:27   No growth  --  --          Radiology Results      Meds    MEDICATIONS  (STANDING):  ergocalciferol 74297 Unit(s) Oral <User Schedule>  sodium chloride 0.9%. 1000 milliLiter(s) (100 mL/Hr) IV Continuous <Continuous>      MEDICATIONS  (PRN):      Physical Exam    Neuro :  no focal deficits  Respiratory: CTA B/L  CV: RRR, S1S2, no murmurs,   Abdominal: Soft, NT, ND +BS,  Extremities: No edema, + peripheral pulses    ASSESSMENT    unresponsiveness and unresponsiveness  r/o post micturation syncope  r/o seiz  No pertinent past medical history  No significant past surgical history      PLAN    nuero f/u noted  cta head and neck neeg noted above  eeg neg noted above  pt need to repeat EEG in a month.   cardio f/u noted  orthostatic neg  tilt table test outpt  cont current meds  pt stable for d/c

## 2018-05-22 NOTE — DISCHARGE NOTE ADULT - PLAN OF CARE
patient will be symptom free You were evaluated by neurologist for syncopal episode. CT head and EEG are normal.   Please follow up with neurologist Dr Green within a month to repeat EEG studies   Also you were examin by the cardiologist Dr Reed. Heart enzymes and Transthorasic echo are within normal limits.   Please follow up with Dr Reed for further evaluation and to scheduled tilt table test as outpatient   The numbers are provided above.

## 2018-05-22 NOTE — DISCHARGE NOTE ADULT - PROVIDER TOKENS
TOKEN:'[id number]:'[id number]:'[id number]' TOKEN:'[id number]:'[id number]:'[id number]'

## 2018-05-22 NOTE — ED ADULT NURSE REASSESSMENT NOTE - NS ED NURSE REASSESS COMMENT FT1
VS WNL well appearing denies dizziness or weakness,  will D/C home with further PCP and Neurologist F/U mother at bed side IV heplock removed site intact
pt responding to verbal stimuli, talking with family members, moving all extremities, no deficit noted, Dr Gomez informed ed observation continues.
remains stable denies pain or discomfort at present well appearing

## 2018-05-22 NOTE — DISCHARGE NOTE ADULT - PATIENT PORTAL LINK FT
You can access the [facility] Patient Portal, offered by [facility], by registering with the following website: [email or web address]

## 2018-05-22 NOTE — DISCHARGE NOTE ADULT - CARE PLAN
Principal Discharge DX:	Syncope, unspecified syncope type  Goal:	patient will be symptom free Principal Discharge DX:	Syncope, unspecified syncope type  Goal:	patient will be symptom free  Assessment and plan of treatment:	You were evaluated by neurologist for syncopal episode. CT head and EEG are normal.   Please follow up with neurologist Dr Green within a month to repeat EEG studies   Also you were examin by the cardiologist Dr Reed. Heart enzymes and Transthorasic echo are within normal limits.   Please follow up with Dr Reed for further evaluation and to scheduled tilt table test as outpatient   The numbers are provided above.

## 2018-05-22 NOTE — DISCHARGE NOTE ADULT - CARE PROVIDER_API CALL
Vega Contreras), Internal Medicine  [address] [place] [address]  Phone: [phone]  Fax: [phone]    Jean Meek), Neurology  [address] [facility]  [address]  [place] [address]  Phone: [phone]  Fax: [phone]    Arnaud Green), Neurology  [address] [facility]  2nd [place] [address]  Phone: [phone]  Fax: [phone] Vega Contreras (MD), Internal Medicine  [address] [place] [address]  Phone: [phone]  Fax: [phone]    Jean Meek (MD), Neurology  [address] [facility]  [address]  [place] [address]  Phone: [phone]  Fax: [phone]    Arnaud Green), Neurology  [address] [facility]  2nd [place] [address]  Phone: [phone]  Fax: [phone]    Kylah Reed), Internal Medicine  [address] [facility]  [address]  [place] [address]  Phone: [phone]  Fax: [phone]

## 2018-05-22 NOTE — PROGRESS NOTE ADULT - SUBJECTIVE AND OBJECTIVE BOX
Follow Up Visit Note:  Pt is seen wand examined. His EEG is normal. His Ct-head and CT-angio head have been unremarkable. On further questioning pt reported that he went to the bathroom and then he passed out. No tongue biting. No jerking movement observed. He feels fine now.    Allergies    No Known Allergies    Intolerances    Social History: [ ] Tobacco, [ ] Alcohol  None    MEDICATIONS  (STANDING):  ergocalciferol 20518 Unit(s) Oral <User Schedule>  sodium chloride 0.9%. 1000 milliLiter(s) (100 mL/Hr) IV Continuous <Continuous>      Review of Systems:  General: [ ] None, [ ] chills, [ ]fatigue, [ ] fevers  Skin: [ ] None, [ ] rash   HEENT: [ ] None, [ ] head injury, [ ] blurred vision, [ ] double vision, [ ] eye pain, [ ] visual loss, [ ] hearing loss, [ ] deafness, [ ] ear pain, [ ] ringing in the ears, [ ] vertigo, [ ] sinus pain, [ ] voice changes  Neck: [ ] None, [ ] neck stiffness  Respiratory: [ ] None, [ ] cough, [ ] difficulty breathing  Cardiovascular: [ ] None, [ ] calf cramps, [ ] chest pain, [ ] leg pain, [ ] swelling, [ ] rapid heart rate, [ ] shortness of breath  Gastrointestinal: [ ] None, [ ] abdominal pain, [ ] nausea, [ ] vomiting  Musculoskeletal: [ ] None, [ ] back pain, [ ] joint pain, [ ] joint stiffness, [ ] leg cramps, [ ] muscle atrophy, [ ] muscle cramps, [ ] muscle weakness, [ ] swelling of extremities  Neurological: [ ] None, [ ] Dizziness, [ ] decreased memory, [ ] fainting, [ ] focal neurological symptoms, [ ] headaches, [ ] incontinence of stool, [ ] incontinence of urine, [ ] loss of consciousness, [ ] numbness, [ ] seizures, [ ] spinning sensation, [ ] stroke, [ ] trouble walking, [ ] unsteadiness, [ ] visual changes, [ ] weakness  Psychiatric: [ ] None,  [ ] depression, [ ] anxiety, [ ] hallucinations, [ ] inability to concentrate, [ ] mood changes, [ ] panic attacks  Hematology: [ ] None,  [ ] blood clots, [ ] spontaneous bleeding    [x ] None except marked above    Vital Signs  Vital Signs Last 24 Hrs  T(C): 36.5 (22 May 2018 11:13), Max: 37.2 (21 May 2018 15:58)  T(F): 97.7 (22 May 2018 11:13), Max: 98.9 (21 May 2018 15:58)  HR: 65 (22 May 2018 11:13) (63 - 79)  BP: 127/80 (22 May 2018 11:13) (113/60 - 127/80)  BP(mean): --  RR: 16 (22 May 2018 11:13) (16 - 17)  SpO2: 98% (22 May 2018 11:13) (98% - 100%)      Neurological Exam:    Mental Status -  Alert, Awake  Fund of Knowledge – normal  Affect- Appropriate  Recent Memory – Normal  Remote Memory – Normal  Attention Span – Normal  Concentration –  Normal  Cognitive function – Normal  Speech – Normal  Thought content/perception – Normal    Cranial Nerves:  II Optic: Visual acuity – Bilateral - Normal ; Visual fields – normal ; Fundi – Bilateral – no optic atrophy or Papilledema  III Oculomotor – normal bilaterally  IV Trochlear – Bilateral – normal  V Trigeminal: Ophthalmic – Bilateral – normal  ;   Maxillary – Bilateral- normal ;  Mandibular – Bilateral - normal  VI Abducens – Bilateral - normal  VII Facial: Normal bilaterally  VIII Acoustic - Bilateral – hearing normal and (hearing tested by finger rub)  IX Glossopharyngeal / X Vagus: Uvula – normal  XI Accessory: normal shoulder shrug  XII Hypoglossal – bilaterally normal  Eye Movements: Gaze – Bilateral - normal    Nystagmus – Bilateral – none  Motor:  Bulk and Contour: normal  Tone: normal  Strength:                                                                        Delt           Bicep      Tricep                                                Upper Extremity: Right           5/5            5/5          5/5              5/5                                                                       Left             5/5            5/5          5/5              5/5                                                                                             HF              KE            KF                DF                                         Lower extremity: Right         5/5             5/5          5/5              5/5                                                                          Left           5/5             5/5          5/5              5/5  General Assessment of Reflexes: Right Wrist - 2+  ;  Left Wrist - 2+ ; Right Elbow - 2+ ;  Left Elbow - 2+ ;  Right Knee - 2+ ;  Left Knee - 2+ ;   Right Ankle – 2+ ; Left Ankle – 2+  Plantar Reflexes( Babinski) (L4-S2) – Bilateral – Flexion  Sensory:  Light Touch: Intact – globally  Pain: Intact – globally  Temperature: Intact – globally  Coordination – no impairment of heel-to-shin, impairment of finger-to-nose or impairment of rapid alternating movement  Gait – Normal tandem walking, normal heel walking and normal toes walking  Romberg’s sign: - Normal

## 2018-05-22 NOTE — DISCHARGE NOTE ADULT - CARE PROVIDERS DIRECT ADDRESSES
,DirectAddress_Unknown,[email or web address],DirectAddress_Unknown ,DirectAddress_Unknown,[email or web address],DirectAddress_Unknown,DirectAddress_Unknown

## 2018-05-22 NOTE — DISCHARGE NOTE ADULT - HOSPITAL COURSE
22 years old male with no PMH or PSH presented to ED with syncope and unresponsiveness. Family reported patient synopsized after coming back from bathroom and became unresponsive so EMS was called. Family denies any shaking, eye rolling or urinary or fecal incontinence.  Per EMS, patient was unresponsive, not talking, no response to rub, pupils continuously dilated.   In ED patient was still unresponsive to painful stimuli so Neurology was consulted. Initial vitals wnl. EKG sinus arrythmia. CT head , CT cervical spine and CTA Head unremarkable. Patient woke up spontaneously and was alert, awake and oriented x 3, initially said can hear only but cant move ; later was able to move his extremities as well. Was then assessed by Neurologist in ED and admitted for further monitoring.   Patient seen and examined at bedside, currently is AAO x 3, denies any complains. Upon asking about events, said that he came back from his night shift the morning of admission, slept for a while and then woke up to go to bathroom, said was not feeling well, came out and fainted. Reported slight dizziness before the event but denied any other complains. Neurology assessed patient and reccomended continued monitoring on 22 years old male with no PMH or PSH presented to ED with syncope and unresponsiveness. Family reported patient synopsized after coming back from bathroom and became unresponsive so EMS was called. Family denies any shaking, eye rolling or urinary or fecal incontinence.    In ED Initial vitals wnl. EKG sinus arrythmia. CT head , CT cervical spine and CTA Head unremarkable. Patient woke up spontaneously and was alert, awake and oriented x 3. Was then assessed by Neurologist in ED and admitted for further monitoring.     Patient remained alert, awake, no focal weakness. NO signs of arrhythmia on telemetry monitoring.   EEG negative for seizure activities.   Pt seen by the cardiologist Dr Reed. TTE done, normal. Pt has normal VS, with no orthostatic changes     Patient is cleared to be discharged home with recommendation to follow up with neurologist for reevaluation and to repeat EEG in one month. Also as per cardiologist recommendation patient needs to follow up as outpatient for further cardiology workup and tilt table test   The number to the cardiologist and neurologist office has been provided

## 2018-05-22 NOTE — CONSULT NOTE ADULT - SUBJECTIVE AND OBJECTIVE BOX
CHIEF COMPLAINT:Patient is a 22y old  Male who presents with a chief complaint of syncope -- r/o seizure.      HPI:  22 years old male with no PMH or PSH presented to ED with syncope and unresponsiveness. Family reported patient synopsized after coming back from bathroom and became unresponsive so EMS was called. Family denies any shaking, eye rolling or urinary or fecal incontinence.  Per EMS, patient was unresponsive, not talking, no response to rub, pupils continuously dilated.     In ED patient was still unresponsive to painful stimuli so Neurology was consulted. Initial vitals wnl. EKG sinus arrythmia. CT head , CT cervical spine and CTA Head unremarkable. Patient woke up spontaneously in a while, was alert, awake and oriented x 3, initially said can hear only but cant move ; later was able to move his extremities as well. Was then assessed by Neurologist in ED and admitted for further monitoring.     Patient seen and examined at bedside, currently is AAO x 3, denies any complains. 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 Reported slight dizziness before the event but denied any other complains. (20 May 2018 22:28)      PAST MEDICAL & SURGICAL HISTORY:  No pertinent past medical history  No significant past surgical history      MEDICATIONS  (STANDING):  sodium chloride 0.9%. 1000 milliLiter(s) (100 mL/Hr) IV Continuous <Continuous>    MEDICATIONS  (PRN):      FAMILY HISTORY:  No pertinent family history in first degree relatives      SOCIAL HISTORY:    [x ] Non-smoker    [x ] Alcohol-denies    Allergies    No Known Allergies    Intolerances    	    REVIEW OF SYSTEMS:  CONSTITUTIONAL: No fever, weight loss, or fatigue  EYES: No eye pain, visual disturbances, or discharge  ENT:  No difficulty hearing, tinnitus, vertigo; No sinus or throat pain  NECK: No pain or stiffness  RESPIRATORY: No cough, wheezing, chills or hemoptysis; No Shortness of Breath  CARDIOVASCULAR: No chest pain, palpitations, + passing out  GASTROINTESTINAL: No abdominal or epigastric pain. No nausea, vomiting, or hematemesis; No diarrhea or constipation. No melena or hematochezia.  GENITOURINARY: No dysuria, frequency, hematuria, or incontinence  NEUROLOGICAL: No headaches, memory loss, loss of strength, numbness, or tremors  SKIN: No itching, burning, rashes, or lesions   LYMPH Nodes: No enlarged glands  ENDOCRINE: No heat or cold intolerance; No hair loss  MUSCULOSKELETAL: No joint pain or swelling; No muscle, back, or extremity pain  PSYCHIATRIC: No depression, anxiety, mood swings, or difficulty sleeping  HEME/LYMPH: No easy bruising, or bleeding gums  ALLERGY AND IMMUNOLOGIC: No hives or eczema	    PHYSICAL EXAM:  T(C): 36.5 (05-22-18 @ 11:13), Max: 37.2 (05-21-18 @ 15:58)  HR: 65 (05-22-18 @ 11:13) (63 - 80)  BP: 127/80 (05-22-18 @ 11:13) (113/60 - 127/80)  RR: 16 (05-22-18 @ 11:13) (16 - 18)  SpO2: 98% (05-22-18 @ 11:13) (98% - 100%)      Appearance: Normal	  HEENT:   Normal oral mucosa, PERRL, EOMI	  Lymphatic: No lymphadenopathy  Cardiovascular: Normal S1 S2, No JVD, No murmurs, No edema  Respiratory: Lungs clear to auscultation	  Psychiatry: A & O x 3, Mood & affect appropriate  Gastrointestinal:  Soft, Non-tender, + BS	  Skin: No rashes, No ecchymoses, No cyanosis	  Neurologic: Non-focal  Extremities: Normal range of motion, No clubbing, cyanosis or edema  Vascular: Peripheral pulses palpable 2+ bilaterally    	  LABS:	 	    CARDIAC MARKERS:  CARDIAC MARKERS ( 21 May 2018 05:49 )  <0.015 ng/mL / x     / 131 U/L / x     / <1.0 ng/mL  CARDIAC MARKERS ( 20 May 2018 16:18 )  x     / x     / 161 U/L / x     / x                            14.7   7.6   )-----------( 196      ( 21 May 2018 05:49 )             44.5     05-22    140  |  106  |  8   ----------------------------<  107<H>  4.2   |  29  |  0.95    Ca    9.5      22 May 2018 06:33  Phos  3.1     05-21  Mg     2.1     05-21    TPro  7.5  /  Alb  4.1  /  TBili  0.4  /  DBili  x   /  AST  16  /  ALT  16  /  AlkPhos  56  05-20      EXAM:  CT ANGIO NECK (W)AW IC                          EXAM:  CT ANGIO BRAIN (W)AW IC                            PROCEDURE DATE:  05/20/2018          INTERPRETATION:  CT angiogram head and neck with contrast  History unresponsive, syncope  ContrastOmnipaque 90 cc; 10 cc discarded  Multiplanar MIPS included    Examination of the intracranial circulation is negative for stenosis,   occlusion or aneurysm. There is normal venous sinus enhancement    Examination of the cervical circulation is negative for carotid or   vertebral artery stenosis, occlusion or dissection. The left vertebral   artery is slightly dominant.    IMPRESSION:  Normal study    A preliminary report was given by [facility].    EEG Impression / Clinical Correlate:  There were no epileptiform abnormalities recorded.      Yessica Sherwood MD  ________________________________________  Neurophysiology/Epilepsy Fellow, [facility] [place]

## 2018-05-22 NOTE — PROGRESS NOTE ADULT - ASSESSMENT
1) Unresponsiveness/ ? Syncope- ? Micturition syncope/? Vaso-vagal syncope- neuro-work up has been negative.

## 2018-05-25 LAB
CULTURE RESULTS: SIGNIFICANT CHANGE UP
CULTURE RESULTS: SIGNIFICANT CHANGE UP
SPECIMEN SOURCE: SIGNIFICANT CHANGE UP
SPECIMEN SOURCE: SIGNIFICANT CHANGE UP

## 2018-07-18 NOTE — DISCHARGE NOTE ADULT - IF YOU ARE A SMOKER, IT IS IMPORTANT FOR YOUR HEALTH TO STOP SMOKING. PLEASE BE AWARE THAT SECOND HAND SMOKE IS ALSO HARMFUL.
This Rn attempted to rtn call however no answer and unable to leave a Vm as it is full.    Statement Selected

## 2021-04-21 NOTE — DISCHARGE NOTE ADULT - DISCHARGE DATE
22-May-2018 PT. c/o 10/10 back pain that has been worsen over the last few weeks, pt. states he had hx of herniated disc and follows with pain management, pt. states sitting is better for pain given 100mcg of fentanyl PTA by EMS. NO acute trauma, pt. has appointment on friday for epidural injection but could not wait.

## 2023-08-21 NOTE — ED ADULT NURSE NOTE - PRIMARY CARE PROVIDER
unknown Dutasteride Male Counseling: Dustasteride Counseling:  I discussed with the patient the risks of use of dutasteride including but not limited to decreased libido, decreased ejaculate volume, and gynecomastia. Women who can become pregnant should not handle medication.  All of the patient's questions and concerns were addressed. Dutasteride Counseling: Dustasteride Counseling:  I discussed with the patient the risks of use of dutasteride including but not limited to decreased libido, decreased ejaculate volume, and gynecomastia. Women who can become pregnant should not handle medication.  All of the patient's questions and concerns were addressed.